# Patient Record
Sex: MALE | Race: WHITE
[De-identification: names, ages, dates, MRNs, and addresses within clinical notes are randomized per-mention and may not be internally consistent; named-entity substitution may affect disease eponyms.]

---

## 2019-08-16 ENCOUNTER — HOSPITAL ENCOUNTER (EMERGENCY)
Dept: HOSPITAL 8 - ED | Age: 64
LOS: 1 days | Discharge: HOME | End: 2019-08-17
Payer: COMMERCIAL

## 2019-08-16 VITALS — WEIGHT: 201.5 LBS | BODY MASS INDEX: 27.29 KG/M2 | HEIGHT: 72 IN

## 2019-08-16 DIAGNOSIS — S32.030A: ICD-10-CM

## 2019-08-16 DIAGNOSIS — S01.81XA: ICD-10-CM
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
## 2019-08-16 DIAGNOSIS — S02.5XXA: ICD-10-CM
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
## 2019-08-16 DIAGNOSIS — S22.060A: Primary | ICD-10-CM

## 2019-08-16 PROCEDURE — 12051 INTMD RPR FACE/MM 2.5 CM/<: CPT

## 2019-08-16 PROCEDURE — 72072 X-RAY EXAM THORAC SPINE 3VWS: CPT

## 2019-08-16 PROCEDURE — 72131 CT LUMBAR SPINE W/O DYE: CPT

## 2019-08-16 PROCEDURE — 72128 CT CHEST SPINE W/O DYE: CPT

## 2019-08-16 PROCEDURE — 70486 CT MAXILLOFACIAL W/O DYE: CPT

## 2019-08-16 PROCEDURE — 72125 CT NECK SPINE W/O DYE: CPT

## 2019-08-16 PROCEDURE — 99284 EMERGENCY DEPT VISIT MOD MDM: CPT

## 2019-08-17 VITALS — DIASTOLIC BLOOD PRESSURE: 79 MMHG | SYSTOLIC BLOOD PRESSURE: 126 MMHG

## 2019-09-18 PROBLEM — Z76.89 ENCOUNTER TO ESTABLISH CARE: Status: ACTIVE | Noted: 2019-09-18

## 2019-09-19 PROBLEM — S32.030S COMPRESSION FRACTURE OF L3 LUMBAR VERTEBRA, SEQUELA: Status: ACTIVE | Noted: 2019-09-19

## 2019-09-19 PROBLEM — S02.609D CLOSED FRACTURE OF MANDIBLE WITH ROUTINE HEALING: Status: ACTIVE | Noted: 2019-09-19

## 2019-09-19 PROBLEM — S22.060D: Status: ACTIVE | Noted: 2019-09-19

## 2024-03-05 PROBLEM — Z76.89 ENCOUNTER TO ESTABLISH CARE: Status: RESOLVED | Noted: 2019-09-18 | Resolved: 2024-03-05

## 2024-03-05 PROBLEM — H83.2X3: Status: ACTIVE | Noted: 2024-03-05

## 2024-08-05 ENCOUNTER — OFFICE VISIT (OUTPATIENT)
Dept: NEUROLOGY | Facility: MEDICAL CENTER | Age: 69
End: 2024-08-05
Attending: SPECIALIST
Payer: MEDICARE

## 2024-08-05 VITALS
TEMPERATURE: 97.8 F | WEIGHT: 214.29 LBS | SYSTOLIC BLOOD PRESSURE: 112 MMHG | OXYGEN SATURATION: 97 % | HEART RATE: 79 BPM | HEIGHT: 72 IN | BODY MASS INDEX: 29.02 KG/M2 | DIASTOLIC BLOOD PRESSURE: 70 MMHG

## 2024-08-05 DIAGNOSIS — R42 VERTIGO: ICD-10-CM

## 2024-08-05 PROCEDURE — 99202 OFFICE O/P NEW SF 15 MIN: CPT | Performed by: SPECIALIST

## 2024-08-05 PROCEDURE — 3078F DIAST BP <80 MM HG: CPT | Performed by: SPECIALIST

## 2024-08-05 PROCEDURE — 99204 OFFICE O/P NEW MOD 45 MIN: CPT | Performed by: SPECIALIST

## 2024-08-05 PROCEDURE — 3074F SYST BP LT 130 MM HG: CPT | Performed by: SPECIALIST

## 2024-08-05 ASSESSMENT — PATIENT HEALTH QUESTIONNAIRE - PHQ9: CLINICAL INTERPRETATION OF PHQ2 SCORE: 0

## 2024-08-05 NOTE — PROGRESS NOTES
"Subjective     Pierre Moore is a 69 y.o. male who presents with New Patient (Vestibular disequilibrium, bilateral)            HPI  History Pierre Moore is a 69-year-old gentleman who was referred by Renard Joseph PAC evaluation of disequilibrium.  In February 2022 patient was cleaning his horse stalls.  He developed.  Of intense vertigo.  This lasted for.  Of several days and improved.  Following that he has had a persistent sensation of disequilibrium.  He feels as if his balance is off.  He feels as if when he turns his head sometimes the whole world moves..  When he is in dark areas he feels as if he loses his balance and will fall.  It is not a sensation where there is movement or vibration he feels as if he loses balance.  In addition he has had tinnitus since 2000 and hearing loss for 20 years.  He was referred to ENT and report dated May 19, 2022 indicates:  \" ENG testing revealed no significant spontaneous or gaze nystagmus.  Smooth pursuit testing revealed normal gain and symmetry.  Psychiatric testing revealed normal latency, accuracy, and velocity.  OPK test results are within normal limits for gain and symmetry.  The Judith-Hallpike was for benign positional vertigo revealed a geotropic up beating nystagmus in the head left position and anageotropic beating nystagmus in the head right position.  Bilateral caloric irrigations revealed a bilateral weakness.  Caloric fixation suppression is normal.  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  In addition he has tried meclizine but did not feel that that helped either.    Past medical history:    1.  Episode of conspicuous vertigo February 2022 followed by persistent disequilibrium.    2.  Left tibial fracture treated " with ORIF.    3.  Status post right knee arthroscopic procedure.    Medications-none    Allergies- none noted    Social history-smokes cigars and drinks occasionally.    Family history positive for heart disease stroke and cancer.    ROS  As above he reports persistent sensation of disequilibrium and difficulty with visual orientation in the dark when objects are moving.         Objective     /70 (BP Location: Left arm, Patient Position: Sitting, BP Cuff Size: Adult)   Pulse 79   Temp 36.6 °C (97.8 °F) (Temporal)   Ht 1.829 m (6')   Wt 97.2 kg (214 lb 4.6 oz)   SpO2 97%   BMI 29.06 kg/m²      Physical Exam  Patient is a cooperative gentleman who is alert.  Speech is fluent and mental status is grossly intact.    HEENT exam reveals to be normocephalic and atraumatic.  Pupils are equal round reactive.  EOMs are full without nystagmus, visual fields are full, optic discs are flat.    Neck is supple.        Neurological Exam  Patient stands without difficulty.  His gait is unremarkable.  He has no drift and no dysmetria.  Rapid alternating movements of the hands are symmetric.    Motor testing revealed intact bulk tone and strength throughout.    Sensory testing was intact to pin and vibration.    Reflexes are symmetric and normoactive at the arms and knees absent at the ankles and toes are downgoing.    Cranial nerves are unremarkable.           Assessment & Plan        1. Vertigo  Mr. Pierre Yeager is a 69-year-old gentleman who had an episode of conspicuous positional vertigo in February 2022 and has had persistent feeling of disequilibrium since then.  Abnormal electronystagmogram consistent with either an abnormality in the lateral semicircular canals or superior portions of the vestibular nerves.  As a result he will have a brain MRI scan to evaluate the brainstem and vestibular apparatus.  He will have laboratory studies as well.  I suggested he retry meclizine as it has been 2 years since he took the  medication.  In addition he is referred once again for vertigo therapy.  Other treatment options would include antidepressant such as serotonin reuptake inhibitors are less likely considering his age tricyclic's.  If he does not improve with these measures I suggest he be referred to ENT.  - MR-BRAIN-WITH & W/O; Future  - THYROID PANEL  - VIT B12,  FOLIC ACID  - Sed Rate; Future  - DAMASO W/REFLEX IF POSITIVE  - Comp Metabolic Panel; Future  - Referral to Physical Therapy

## 2024-11-20 ENCOUNTER — OFFICE VISIT (OUTPATIENT)
Dept: NEUROLOGY | Facility: MEDICAL CENTER | Age: 69
End: 2024-11-20
Attending: SPECIALIST
Payer: MEDICARE

## 2024-11-20 VITALS
DIASTOLIC BLOOD PRESSURE: 70 MMHG | SYSTOLIC BLOOD PRESSURE: 114 MMHG | HEART RATE: 61 BPM | WEIGHT: 205.69 LBS | BODY MASS INDEX: 27.86 KG/M2 | TEMPERATURE: 98 F | OXYGEN SATURATION: 96 % | RESPIRATION RATE: 12 BRPM | HEIGHT: 72 IN

## 2024-11-20 DIAGNOSIS — I63.9 CEREBROVASCULAR ACCIDENT (CVA), UNSPECIFIED MECHANISM (HCC): ICD-10-CM

## 2024-11-20 DIAGNOSIS — R42 VERTIGO: ICD-10-CM

## 2024-11-20 PROCEDURE — 99213 OFFICE O/P EST LOW 20 MIN: CPT | Performed by: SPECIALIST

## 2024-11-20 PROCEDURE — 99211 OFF/OP EST MAY X REQ PHY/QHP: CPT | Performed by: SPECIALIST

## 2024-11-20 PROCEDURE — 3074F SYST BP LT 130 MM HG: CPT | Performed by: SPECIALIST

## 2024-11-20 PROCEDURE — 3078F DIAST BP <80 MM HG: CPT | Performed by: SPECIALIST

## 2024-11-20 ASSESSMENT — PATIENT HEALTH QUESTIONNAIRE - PHQ9: CLINICAL INTERPRETATION OF PHQ2 SCORE: 0

## 2024-12-03 PROBLEM — Z00.00 MEDICARE ANNUAL WELLNESS VISIT, SUBSEQUENT: Status: ACTIVE | Noted: 2019-09-18

## 2024-12-03 PROBLEM — E78.2 MIXED HYPERLIPIDEMIA: Status: ACTIVE | Noted: 2024-12-03

## 2024-12-08 PROBLEM — S02.609D CLOSED FRACTURE OF MANDIBLE WITH ROUTINE HEALING: Status: RESOLVED | Noted: 2019-09-19 | Resolved: 2024-12-08

## 2025-02-12 ENCOUNTER — TELEPHONE (OUTPATIENT)
Dept: NEUROLOGY | Facility: MEDICAL CENTER | Age: 70
End: 2025-02-12
Payer: MEDICARE

## 2025-02-13 NOTE — TELEPHONE ENCOUNTER
NEUROLOGY PATIENT PRE-VISIT PLANNING     Patient was NOT contacted to complete PVP.  Note: Patient will not be contacted if there is no indication to call.     Patient Appointment is scheduled as: Established Patient     Is visit type and length scheduled correctly? Yes    [employer] Patient is checked in Patient Demographics? Yes    3.   Is referral attached to visit? Yes    4. Were records received from referring provider? Yes    4. Patient was NOT contacted to have someone accompany them to visit.     5. Is this appointment scheduled as a Hospital Follow-Up?  No    6. Does the patient require any pre procedure or post procedure follow up? No    7. If any orders were placed at last visit or intended to be done for this visit do we have Results/Consult Notes? No  Labs - Labs were not ordered at last office visit.  Imaging - Imaging ordered, NOT completed. Patient advised to complete prior to next appointment. Pt. Wants to reschedule appt. For a later date once Ultrasound is done.   Referrals - Referral ordered, patient has NOT been seen.  Note: If patient appointment is for lab or imaging review and patient did not complete the studies, check with provider if OK to reschedule patient until completed.    8. If patient appointment is for Botox - is order pended for provider? N/A    9. Was Plan Assessment from last Neurology Office Visit Reviewed?  Yes

## 2025-02-13 NOTE — TELEPHONE ENCOUNTER
Spoke to pt. On the phone regarding his upcoming appt. He wants to cancel and reschedule later when he gets his ultrasound done. He will call me back once he finds out which place he would like to go to get it done.   Shellie Perkins, Med Ass't

## 2025-02-13 NOTE — TELEPHONE ENCOUNTER
Received call back from pt. Want's US orders to go to [facility].  Shellie Perkins, Med Ass't

## 2025-02-19 ENCOUNTER — APPOINTMENT (OUTPATIENT)
Dept: NEUROLOGY | Facility: MEDICAL CENTER | Age: 70
End: 2025-02-19
Attending: SPECIALIST
Payer: MEDICARE